# Patient Record
Sex: MALE | Race: WHITE
[De-identification: names, ages, dates, MRNs, and addresses within clinical notes are randomized per-mention and may not be internally consistent; named-entity substitution may affect disease eponyms.]

---

## 2019-09-11 ENCOUNTER — HOSPITAL ENCOUNTER (OUTPATIENT)
Dept: HOSPITAL 92 - BICRAD | Age: 84
Discharge: HOME | End: 2019-09-11
Attending: FAMILY MEDICINE
Payer: MEDICARE

## 2019-09-11 DIAGNOSIS — M16.11: ICD-10-CM

## 2019-09-11 DIAGNOSIS — M25.551: ICD-10-CM

## 2019-09-11 DIAGNOSIS — M79.604: Primary | ICD-10-CM

## 2019-09-11 DIAGNOSIS — M17.11: ICD-10-CM

## 2019-09-11 NOTE — RAD
RIGHT FEMUR 2 VIEWS:

 

Date:  09/11/19 

 

HISTORY:  

Pain in right leg. 

 

COMPARISON:  

None. 

 

FINDINGS:

Surgical clips along the right hemipelvis. There are ring osteophytes at the right femoral head/neck 
junction. No acute displaced fracture is appreciated. 

 

Mild medial and lateral compartment narrowing of the knee. Mild enthesopathic change of the greater t
rochanter. 

 

Right obturator ring is intact. 

 

IMPRESSION: 

1.  Mild degenerative change of the right hip. 

2.  Moderate degenerative changes of the medial and lateral compartments of the right knee. 

3.  Mild enthesopathic changes of the greater trochanter. 

 

 

POS: [facility]

## 2020-05-12 ENCOUNTER — HOSPITAL ENCOUNTER (INPATIENT)
Dept: HOSPITAL 92 - ERS | Age: 85
LOS: 2 days | Discharge: ANOTHER HOSPITAL | DRG: 310 | End: 2020-05-14
Attending: FAMILY MEDICINE | Admitting: FAMILY MEDICINE
Payer: MEDICARE

## 2020-05-12 VITALS — BODY MASS INDEX: 24.9 KG/M2

## 2020-05-12 DIAGNOSIS — Z95.5: ICD-10-CM

## 2020-05-12 DIAGNOSIS — G47.33: ICD-10-CM

## 2020-05-12 DIAGNOSIS — R00.1: Primary | ICD-10-CM

## 2020-05-12 DIAGNOSIS — F32.9: ICD-10-CM

## 2020-05-12 DIAGNOSIS — Z79.82: ICD-10-CM

## 2020-05-12 DIAGNOSIS — R09.89: ICD-10-CM

## 2020-05-12 DIAGNOSIS — E29.1: ICD-10-CM

## 2020-05-12 DIAGNOSIS — Z79.899: ICD-10-CM

## 2020-05-12 DIAGNOSIS — H91.10: ICD-10-CM

## 2020-05-12 DIAGNOSIS — I10: ICD-10-CM

## 2020-05-12 DIAGNOSIS — K21.9: ICD-10-CM

## 2020-05-12 DIAGNOSIS — I25.10: ICD-10-CM

## 2020-05-12 DIAGNOSIS — G25.81: ICD-10-CM

## 2020-05-12 LAB
ALBUMIN SERPL BCG-MCNC: 4.2 G/DL (ref 3.4–4.8)
ALBUMIN SERPL BCG-MCNC: 4.3 G/DL (ref 3.4–4.8)
ALP SERPL-CCNC: 48 U/L (ref 40–110)
ALP SERPL-CCNC: 49 U/L (ref 40–110)
ALT SERPL W P-5'-P-CCNC: 10 U/L (ref 8–55)
ALT SERPL W P-5'-P-CCNC: 11 U/L (ref 8–55)
ANION GAP SERPL CALC-SCNC: 13 MMOL/L (ref 10–20)
ANION GAP SERPL CALC-SCNC: 15 MMOL/L (ref 10–20)
AST SERPL-CCNC: 15 U/L (ref 5–34)
AST SERPL-CCNC: 17 U/L (ref 5–34)
BASOPHILS # BLD AUTO: 0 THOU/UL (ref 0–0.2)
BASOPHILS # BLD AUTO: 0.1 THOU/UL (ref 0–0.2)
BASOPHILS NFR BLD AUTO: 0.3 % (ref 0–1)
BASOPHILS NFR BLD AUTO: 0.6 % (ref 0–1)
BILIRUB SERPL-MCNC: 0.4 MG/DL (ref 0.2–1.2)
BILIRUB SERPL-MCNC: 0.7 MG/DL (ref 0.2–1.2)
BUN SERPL-MCNC: 12 MG/DL (ref 8.4–25.7)
BUN SERPL-MCNC: 9 MG/DL (ref 8.4–25.7)
CALCIUM SERPL-MCNC: 9.4 MG/DL (ref 7.8–10.44)
CALCIUM SERPL-MCNC: 9.4 MG/DL (ref 7.8–10.44)
CHLORIDE SERPL-SCNC: 97 MMOL/L (ref 98–107)
CHLORIDE SERPL-SCNC: 99 MMOL/L (ref 98–107)
CO2 SERPL-SCNC: 23 MMOL/L (ref 23–31)
CO2 SERPL-SCNC: 26 MMOL/L (ref 23–31)
CREAT CL PREDICTED SERPL C-G-VRATE: 0 ML/MIN (ref 70–130)
CREAT CL PREDICTED SERPL C-G-VRATE: 52 ML/MIN (ref 70–130)
EOSINOPHIL # BLD AUTO: 0 THOU/UL (ref 0–0.7)
EOSINOPHIL # BLD AUTO: 0.1 THOU/UL (ref 0–0.7)
EOSINOPHIL NFR BLD AUTO: 0.7 % (ref 0–10)
EOSINOPHIL NFR BLD AUTO: 0.8 % (ref 0–10)
GLOBULIN SER CALC-MCNC: 2.6 G/DL (ref 2.4–3.5)
GLOBULIN SER CALC-MCNC: 2.7 G/DL (ref 2.4–3.5)
GLUCOSE SERPL-MCNC: 103 MG/DL (ref 83–110)
GLUCOSE SERPL-MCNC: 123 MG/DL (ref 83–110)
HGB BLD-MCNC: 12.7 G/DL (ref 14–18)
HGB BLD-MCNC: 13.4 G/DL (ref 14–18)
LYMPHOCYTES # BLD: 0.7 THOU/UL (ref 1.2–3.4)
LYMPHOCYTES # BLD: 2.3 THOU/UL (ref 1.2–3.4)
LYMPHOCYTES NFR BLD AUTO: 12.4 % (ref 21–51)
LYMPHOCYTES NFR BLD AUTO: 19.3 % (ref 21–51)
MAGNESIUM SERPL-MCNC: 2.1 MG/DL (ref 1.6–2.6)
MCH RBC QN AUTO: 29.4 PG (ref 27–31)
MCH RBC QN AUTO: 29.5 PG (ref 27–31)
MCV RBC AUTO: 91.2 FL (ref 78–98)
MCV RBC AUTO: 91.3 FL (ref 78–98)
MONOCYTES # BLD AUTO: 0.5 THOU/UL (ref 0.11–0.59)
MONOCYTES # BLD AUTO: 1.6 THOU/UL (ref 0.11–0.59)
MONOCYTES NFR BLD AUTO: 13.9 % (ref 0–10)
MONOCYTES NFR BLD AUTO: 8.5 % (ref 0–10)
NEUTROPHILS # BLD AUTO: 4.7 THOU/UL (ref 1.4–6.5)
NEUTROPHILS # BLD AUTO: 7.6 THOU/UL (ref 1.4–6.5)
NEUTROPHILS NFR BLD AUTO: 65.5 % (ref 42–75)
NEUTROPHILS NFR BLD AUTO: 78.1 % (ref 42–75)
PLATELET # BLD AUTO: 265 THOU/UL (ref 130–400)
PLATELET # BLD AUTO: 300 THOU/UL (ref 130–400)
POTASSIUM SERPL-SCNC: 4 MMOL/L (ref 3.5–5.1)
POTASSIUM SERPL-SCNC: 4.2 MMOL/L (ref 3.5–5.1)
RBC # BLD AUTO: 4.32 MILL/UL (ref 4.7–6.1)
RBC # BLD AUTO: 4.53 MILL/UL (ref 4.7–6.1)
SODIUM SERPL-SCNC: 132 MMOL/L (ref 136–145)
SODIUM SERPL-SCNC: 133 MMOL/L (ref 136–145)
WBC # BLD AUTO: 11.7 THOU/UL (ref 4.8–10.8)
WBC # BLD AUTO: 6 THOU/UL (ref 4.8–10.8)

## 2020-05-12 PROCEDURE — 84443 ASSAY THYROID STIM HORMONE: CPT

## 2020-05-12 PROCEDURE — 85025 COMPLETE CBC W/AUTO DIFF WBC: CPT

## 2020-05-12 PROCEDURE — 93306 TTE W/DOPPLER COMPLETE: CPT

## 2020-05-12 PROCEDURE — 84100 ASSAY OF PHOSPHORUS: CPT

## 2020-05-12 PROCEDURE — 83605 ASSAY OF LACTIC ACID: CPT

## 2020-05-12 PROCEDURE — 71045 X-RAY EXAM CHEST 1 VIEW: CPT

## 2020-05-12 PROCEDURE — 80048 BASIC METABOLIC PNL TOTAL CA: CPT

## 2020-05-12 PROCEDURE — 36416 COLLJ CAPILLARY BLOOD SPEC: CPT

## 2020-05-12 PROCEDURE — 84484 ASSAY OF TROPONIN QUANT: CPT

## 2020-05-12 PROCEDURE — 36415 COLL VENOUS BLD VENIPUNCTURE: CPT

## 2020-05-12 PROCEDURE — 82533 TOTAL CORTISOL: CPT

## 2020-05-12 PROCEDURE — 83735 ASSAY OF MAGNESIUM: CPT

## 2020-05-12 PROCEDURE — 80053 COMPREHEN METABOLIC PANEL: CPT

## 2020-05-12 PROCEDURE — 93880 EXTRACRANIAL BILAT STUDY: CPT

## 2020-05-12 PROCEDURE — 93005 ELECTROCARDIOGRAM TRACING: CPT

## 2020-05-12 RX ADMIN — Medication SCH ML: at 21:23

## 2020-05-12 RX ADMIN — ISOSORBIDE MONONITRATE SCH MG: 30 TABLET, EXTENDED RELEASE ORAL at 21:23

## 2020-05-12 NOTE — RAD
CHEST 1 VIEW:

 

HISTORY: 

Syncope and collapse.

 

COMPARISON: 

Radiograph 10/18/2017.

 

FINDINGS: 

Pulmonary arteries are mildly distended.  The transverse aorta has dense calcifications.  No confluen
t airspace consolidation, pneumothorax, or effusion.  Moderate degenerative change of both shoulders.


 

IMPRESSION: 

No acute intrathoracic abnormality.

 

POS: HOME

## 2020-05-13 LAB
ANION GAP SERPL CALC-SCNC: 13 MMOL/L (ref 10–20)
BASOPHILS # BLD AUTO: 0 THOU/UL (ref 0–0.2)
BASOPHILS NFR BLD AUTO: 0.1 % (ref 0–1)
BUN SERPL-MCNC: 12 MG/DL (ref 8.4–25.7)
CALCIUM SERPL-MCNC: 8.8 MG/DL (ref 7.8–10.44)
CHLORIDE SERPL-SCNC: 99 MMOL/L (ref 98–107)
CO2 SERPL-SCNC: 25 MMOL/L (ref 23–31)
CREAT CL PREDICTED SERPL C-G-VRATE: 64 ML/MIN (ref 70–130)
EOSINOPHIL # BLD AUTO: 0 THOU/UL (ref 0–0.7)
EOSINOPHIL NFR BLD AUTO: 0.2 % (ref 0–10)
GLUCOSE SERPL-MCNC: 113 MG/DL (ref 83–110)
HGB BLD-MCNC: 12.6 G/DL (ref 14–18)
LYMPHOCYTES # BLD: 0.9 THOU/UL (ref 1.2–3.4)
LYMPHOCYTES NFR BLD AUTO: 11.2 % (ref 21–51)
MCH RBC QN AUTO: 30.4 PG (ref 27–31)
MCV RBC AUTO: 92 FL (ref 78–98)
MONOCYTES # BLD AUTO: 0.8 THOU/UL (ref 0.11–0.59)
MONOCYTES NFR BLD AUTO: 9.7 % (ref 0–10)
NEUTROPHILS # BLD AUTO: 6.4 THOU/UL (ref 1.4–6.5)
NEUTROPHILS NFR BLD AUTO: 78.9 % (ref 42–75)
PLATELET # BLD AUTO: 268 THOU/UL (ref 130–400)
POTASSIUM SERPL-SCNC: 4.2 MMOL/L (ref 3.5–5.1)
RBC # BLD AUTO: 4.15 MILL/UL (ref 4.7–6.1)
SODIUM SERPL-SCNC: 133 MMOL/L (ref 136–145)
WBC # BLD AUTO: 8.2 THOU/UL (ref 4.8–10.8)

## 2020-05-13 RX ADMIN — Medication SCH ML: at 08:56

## 2020-05-13 RX ADMIN — ISOSORBIDE MONONITRATE SCH MG: 30 TABLET, EXTENDED RELEASE ORAL at 20:17

## 2020-05-13 RX ADMIN — Medication SCH ML: at 20:17

## 2020-05-13 RX ADMIN — ASPIRIN 81 MG CHEWABLE TABLET SCH MG: 81 TABLET CHEWABLE at 08:55

## 2020-05-13 NOTE — HP
CHIEF COMPLAINT:  Syncope.



HISTORY OF PRESENT ILLNESS:  The patient is an 85-year-old gentleman, who was 
at the

ear doctor, getting his ears examined when the physician noted he was not

responsive.  He actually lost control of his bladder.  EMS was called.  When 
they

arrived, his pulse was noted to be in the 30s.  They gave him some atropine and 
by

the time he arrived in the ER at [facility], his pulse rate was under the 
70s

and has been elevated ever since.  He does not recall the details as much as he

remembers getting lightheaded while the physician was examining him.  This is 
one of

many syncopal episodes this gentleman has had over the last several years.  It 
has

been a common occurrence and he has been approached about having a possible

pacemaker in the past, which he is adamantly opposed to.  He is in the ER at 
[facility] now, currently in no acute distress, and understands that he will be 
watched

overnight for further assessment of his heart rate and cardiology consultation 
will

be obtained as well as an echocardiogram and serial reassessments and 
orthostatic

vital signs. 



PAST MEDICAL HISTORY:  As mentioned above is significant for multiple

hospitalizations for previous syncopal episodes.  He also has severe

gastroesophageal reflux disease, hypertension, chronic dizziness, history of

obstructive sleep apnea.  He is very hard of hearing.  History of coronary 
artery

disease with stent, restless legs syndrome, depression, hypogonadism. 



PAST SURGICAL HISTORY:  Includes repair of inguinal hernia, tonsillectomy,

hemorrhoidectomy, prostate cancer in 2006, and severe head trauma with a horse 
in

1963. 



ALLERGIES:  NONE.



SOCIAL HISTORY:  He is .  Has not smoked or drinks alcohol.  He is 
retired.



FAMILY HISTORY:  Noncontributory.



MEDICATIONS ON ADMISSION:  Include:

1. Amlodipine recently switched from 5 mg b.i.d. to 10 mg.

2. Aspirin 81 mg daily.

3. Clonidine 0.1 mg b.i.d.

4. Imdur 30 mg at bedtime.

5. Losartan 100 mg p.o. daily.

6. Pantoprazole 20 mg daily.

7. Ropinirole 1 mg at bedtime.

8. He is also on sertraline 25 mg at bedtime.

9. At the time of admission on bisoprolol hydrochlorothiazide 5/6.25 mg daily.

10. He also takes testosterone 1% gel four pumps daily.



REVIEW OF SYSTEMS:  At the time of admission: 

CONSTITUTIONAL:  Denies any recent fever, chills, or weight loss. 

HEENT:  He has had trouble getting his ears cleaned, which is why he was at the 
ear

physician, but has had no drainage or discharge or pain from these. 

CARDIOVASCULAR:  Denies chest pain or palpitations. 

PULMONARY:  Denies shortness of breath or cough. 

GASTROINTESTINAL:  Denies any nausea, vomiting, or diarrhea. 

:  Denies any dysuria or blood in urine or stool. 

ENDOCRINE:  Denies any polydipsia, polyphagia, or intolerance to temperatures 
hot or

cold. 

MUSCULOSKELETAL:  Has diffuse arthralgias, but no specific joints at this time 
are

painful. 

SKIN:  No new rashes or lesions. 

NEUROLOGICAL:  No trouble with mentation, although he is very hard of hearing.



PHYSICAL EXAMINATION:

VITAL SIGNS:  At the time of admission, blood pressure 167/100, pulse 87,

respirations 16, temperature is 98.3.  Pain scale 0. 

GENERAL:  This is a well-developed, well-nourished, elderly  male, 
alert,

oriented, cooperative. 

HEENT:  Normocephalic, atraumatic.  Pupils are equal, round, and reactive to 
light

with arcus senilis bilaterally.  TMs, nares, and pharynx are clear. 

NECK:  Supple.  Trachea midline.  No bruits. 

CHEST:  Clear to auscultation. 

HEART:  Regular rate and rhythm without murmur. 

ABDOMEN:  Scaphoid and nontender without hepatosplenomegaly. 

:  Deferred. 

EXTREMITIES:  Without clubbing, cyanosis, or edema.  Symmetrical muscular 
wasting in

upper and lower extremities.  Normal range of motion of all upper and lower

extremities. 

SKIN:  No acute rashes or lesions. 

NEUROLOGIC:  Cranial nerves except for cranial nerve 8 are intact.  Gait and

cerebellar function intact.  Sensory exam is grossly intact.  Mental status is 
at

baseline and nonfocal. 



LABORATORY DATA:  Lab work thus far shows WBC 6, hemoglobin 12.7, hematocrit 
39.4

with platelets of 265.  Chest x-ray shows no acute intrathoracic disease.  
Sodium

132, potassium 4.2, chloride 97, CO2 of 26, BUN 9, creatinine 0.95 with a GFR 75
,

glucose 103,.  Liver functions unremarkable.  Troponins

unremarkable. 



ASSESSMENT:  

1. Syncopal episode.  Etiologies include possible bradycardia.

2. Hypertension.

3. Severe anxiety disorder.

4. Presbycusis.



PLAN:  Plan will be observing his orthostatic vital signs on telemetry.  
Cardiology

consultation done.  We will also get echocardiogram and serially re-evaluate

him. 







Job ID:  [id number]



MTDD

## 2020-05-14 VITALS — SYSTOLIC BLOOD PRESSURE: 148 MMHG | DIASTOLIC BLOOD PRESSURE: 64 MMHG

## 2020-05-14 VITALS — TEMPERATURE: 97.4 F

## 2020-05-14 RX ADMIN — ASPIRIN 81 MG CHEWABLE TABLET SCH MG: 81 TABLET CHEWABLE at 08:25

## 2020-05-14 RX ADMIN — Medication SCH: at 08:25

## 2020-05-14 NOTE — CON
DATE OF CONSULTATION:  



HISTORY:  Donald Peoples is an 85-year-old white male with previous history of

coronary stent placement and multiple episodes of syncope who is usually a 
patient

of Dr. Jacobson.  He was hospitalized here in 2017, and was seen by Dr. Harris, 
however,

apparently the family requested that I see him while he was at [place].

Yesterday, he was at the ENT doctor and had a syncopal episode and was

unresponsive.  When paramedics arrived, he had heart rates in the 30s.  He was 
given

atropine and transferred here.  Also last night, he had an episode of 
bradycardia

with heart rates in the 30s.  He denies any chest discomfort or shortness of 
breath.

 He states he has frequent lightheaded spells at home, but is uncertain if he 
ever

passes out. 



PAST MEDICAL HISTORY:  Coronary artery disease, hypertension, GERD, obstructive

sleep apnea, hard of hearing, depression. 



PAST SURGICAL HISTORY:  Coronary artery stents, inguinal herniorrhaphy,

tonsillectomy, hemorrhoidectomy, prostate cancer.  He had a head trauma from a 
horse

in 1963. 



MEDICATIONS:  Amlodipine 10 mg daily, Diflucan 100 mg daily, ibuprofen 600 mg 
q.6

hours p.r.n., losartan 100 daily, Protonix 20 daily, and Bactrim 1 b.i.d. 



ALLERGIES:  NONE.



SOCIAL HISTORY:  He does not smoke or drink.



REVIEW OF SYSTEMS:  Unremarkable.



PHYSICAL EXAMINATION:

VITAL SIGNS:  Blood pressure 136/59, pulse 57. 

HEENT:  PERRL. 

NECK:  Supple. 

CHEST:  Clear. 

CARDIAC:  S1 and S2 normal without any S3, S4, or murmurs.  Carotid upstroke was

normal with bilateral carotid artery bruits. 

ABDOMEN:  Normal bowel sounds without tenderness or organomegaly. 

EXTREMITIES:  Reveal no clubbing, cyanosis, or edema. 

NEUROLOGICAL:  Grossly intact except for hard of hearing.



LABORATORY DATA:  EKG revealed sinus rhythm with first-degree AV block.

Echocardiogram revealed ejection fraction of 60% to 65% with evidence of 
diastolic

dysfunction, mild mitral regurgitation, mild aortic regurgitation, and trace

tricuspid regurgitation.  Hemoglobin 12.6, hematocrit 38.2, white count 8200,

platelets 268,000.  Sodium 133, potassium 4.2, chloride 99, carbon dioxide 25, 
BUN

12, creatinine 0.99.  Troponin I is normal x2.  TSH is normal.  Cortisol is 
normal. 



IMPRESSION:  

1. Multiple syncopal episodes secondary to sinus bradycardia.  He has documented

heart rate with paramedics apparently at 30 per minute and also here in the 
hospital

at 30 per minute. 

2. Bilateral carotid artery bruits.  In looking at the computer, I do not see 
that

this has ever been evaluated.  Certainly, this may also be contributing to his

syncope. 

3. Coronary artery disease, status post stent placement.

4. Hypertension.

5. Gastroesophageal reflux disease.

6. Obstructive sleep apnea.

7. Hard of hearing.

8. Anxiety.



PLAN:  The situation was discussed with the patient.  Carotid Doppler will be 
obtained

to evaluate his bruits.  He certainly should have a pacemaker inserted; however
, I

am not certain that he or his wife wish to pursue that in this institution.  If 
not,

he needs to be either transferred or an early appointment with his usual

cardiologist. 







Job ID:  [id number]



MTDD

## 2020-05-14 NOTE — ULT
Carotid duplex sonogram



HISTORY: Syncope. Bilateral bruits. Vascular disease.



FINDINGS:



Right: Scattered mild plaque. Color and spectral Doppler evaluation, peak systolic velocity of 89 cm/
s, and IC to CC ratio of 1.1 suggest no hemodynamically significant stenosis within the

extracranial right ICA. Antegrade flow within the vertebral artery.



Left: Mild plaque. Echogenic stent within the ICA is patent. Color and spectral Doppler evaluation, p
eak systolic velocity of 101 cm/s, and IC to CC ratio of 1.2 suggest no hemodynamically significant

stenosis within the extracranial left ICA. Antegrade flow within the vertebral artery.



  



IMPRESSION :

Atherosclerosis. Left ICA stent.



No sonographic evidence of significant extracranial ICA stenosis.



Reported By: ZENY Greenwood 

Electronically Signed:  5/14/2020 8:15 AM

## 2020-05-15 NOTE — DIS
DATE OF ADMISSION:  05/13/2020



DATE OF DISCHARGE:  05/14/2020



DATE OF TRANSFER TO [facility]:  05/14/2020.



CHIEF COMPLAINT ON ADMISSION:  Syncope due to bradycardia. 



History and physical have been dictated.  I will resume from there.



HOSPITAL COURSE:  The patient was placed in an IMCU monitored bed, where 
supportive

measures were maintained.  Over the next 24 hours, he had a repeat episode of

bradycardia, for which he passed out.  He was easily revived with symptomatic

treatment, but then proceeded to vomit afterwards, which was his typical post

syncopal procedure.  Thereafter, he was fine.  Dr. Rollins was called about this

shortly after it occurred, but was told he did not need to come to the code 
green

because the patient is in his usual state of good health.  His viral load was 
back

into the 60s and his services were not needed.  Over the first 24 hours in the

hospital, Dr. Rollins had ordered an echocardiogram and since when it had not been

performed in quite some time, his ejection fraction was from 65% to 69%.  He had

some mild diastolic dysfunction.  Otherwise, his heart looked good.  The 
patient was

noted to have carotid bruits when Dr. Logan was consulted and a carotid

ultrasound was ordered, which failed to show any significant carotid stenosis.  
When

it was evident that a pacemaker was needed, Dr. Logan and Dr. Rollins were

confirmed with the patient, who adamantly refused to have a pacemaker placed at 
this

institution and so arrangements were made to transfer him to see [facility], where his cardiologist, Dr. Basilio Jacobson was contacted and accepted 
him in

transfer.  He will possibly be able to talk the patient into a pacemaker, 
should he

agree that would be advantageous.  The patient is transferred to [facility]

in stable condition. 



His diagnoses at the time of transfer:

1. Syncope due to bradycardia.

2. Sick sinus syndrome.

3. Hypertension.

4. Generalized anxiety disorder.

5. Severe presbycusis. 



The patient's medications will be maintained the same from one institution to 
the

next. The time required to prepare the pt for transfer including reconciliation 
of his medication came to  30 minutes.



He is to follow up with Dr. Rollins in 1 week after discharge from [facility].







Job ID:  [id number]



[facility]

## 2020-05-16 NOTE — EKG
Test Reason : 

Blood Pressure : ***/*** mmHG

Vent. Rate : 068 BPM     Atrial Rate : 068 BPM

   P-R Int : 246 ms          QRS Dur : 102 ms

    QT Int : 390 ms       P-R-T Axes : 056 -43 056 degrees

   QTc Int : 414 ms

 

Sinus rhythm with sinus arrhythmia with 1st degree A-V block

Left axis deviation

Moderate voltage criteria for LVH, may be normal variant

Abnormal ECG

 

Confirmed by CAROL ANN CARBALLO DO ([id number]),  NIKKI AMARAL ([id number]) on 5/16/2020 1:00:53 PM

 

Referred By:             Confirmed By:CAROL ANN CARBALLO DO

## 2020-06-05 ENCOUNTER — HOSPITAL ENCOUNTER (OUTPATIENT)
Dept: HOSPITAL 92 - BICULT | Age: 85
Discharge: HOME | End: 2020-06-05
Attending: FAMILY MEDICINE
Payer: MEDICARE

## 2020-06-05 DIAGNOSIS — E04.1: Primary | ICD-10-CM

## 2020-06-05 PROCEDURE — 76536 US EXAM OF HEAD AND NECK: CPT

## 2020-06-05 NOTE — ULT
EXAM:

US Thyroid STANDARD



PROVIDED CLINICAL HISTORY:

Thyroid nodule.



COMPARISON:

None



FINDINGS:

Right lobe of the thyroid gland measures 4.1 cm x 1.1 cm x 2 cm with the left lobe measuring 4.4 cm x
 1.2 cm x 2 cm. The thyroid isthmus measures 0.23 cm in thickness.



There is a 9 mm heterogeneous but predominantly hypoechoic nodule with circumscribed margins seen in 
the midportion left lobe of the thyroid gland with a single punctate echogenic focus seen. No

additional nodule is seen in either lobe of the thyroid gland.



IMPRESSION:

TI RADS level 5-highly suspicious nodule left lobe thyroid gland. According to ACR guidelines, and ba
sed on size criteria, follow-up evaluation is recommended in one year.



Reported By: Warren Corral 

Electronically Signed:  6/5/2020 8:44 AM

## 2022-08-18 ENCOUNTER — HOSPITAL ENCOUNTER (EMERGENCY)
Dept: HOSPITAL 92 - CSHERS | Age: 87
Discharge: HOME | End: 2022-08-18
Payer: MEDICARE

## 2022-08-18 DIAGNOSIS — F41.9: Primary | ICD-10-CM

## 2022-08-18 DIAGNOSIS — E87.1: ICD-10-CM

## 2022-08-18 DIAGNOSIS — K21.9: ICD-10-CM

## 2022-08-18 DIAGNOSIS — Z79.899: ICD-10-CM

## 2022-08-18 DIAGNOSIS — Z79.82: ICD-10-CM

## 2022-08-18 DIAGNOSIS — R05.9: ICD-10-CM

## 2022-08-18 DIAGNOSIS — I10: ICD-10-CM

## 2022-08-18 LAB
ALBUMIN SERPL BCG-MCNC: 4.5 G/DL (ref 3.4–4.8)
ALP SERPL-CCNC: 45 U/L (ref 40–110)
ALT SERPL W P-5'-P-CCNC: 19 U/L (ref 8–55)
ANION GAP SERPL CALC-SCNC: 14 MMOL/L (ref 10–20)
AST SERPL-CCNC: 27 U/L (ref 5–34)
BASOPHILS # BLD AUTO: 0 10X3/UL (ref 0–0.2)
BASOPHILS NFR BLD AUTO: 0.2 % (ref 0–2)
BILIRUB SERPL-MCNC: 0.7 MG/DL (ref 0.2–1.2)
BUN SERPL-MCNC: 7 MG/DL (ref 8.4–25.7)
CALCIUM SERPL-MCNC: 9 MG/DL (ref 7.8–10.44)
CHLORIDE SERPL-SCNC: 93 MMOL/L (ref 98–107)
CO2 SERPL-SCNC: 23 MMOL/L (ref 23–31)
CREAT CL PREDICTED SERPL C-G-VRATE: 0 ML/MIN (ref 70–130)
EOSINOPHIL # BLD AUTO: 0 10X3/UL (ref 0–0.5)
EOSINOPHIL NFR BLD AUTO: 0.9 % (ref 0–6)
GLOBULIN SER CALC-MCNC: 2.5 G/DL (ref 2.4–3.5)
GLUCOSE SERPL-MCNC: 105 MG/DL (ref 83–110)
HGB BLD-MCNC: 12.5 G/DL (ref 13.5–17.5)
LYMPHOCYTES NFR BLD AUTO: 28.7 % (ref 18–47)
MCH RBC QN AUTO: 30.8 PG (ref 27–33)
MCV RBC AUTO: 84.2 FL (ref 81.2–95.1)
MONOCYTES # BLD AUTO: 0.5 10X3/UL (ref 0–1.1)
MONOCYTES NFR BLD AUTO: 11.8 % (ref 0–10)
NEUTROPHILS # BLD AUTO: 2.5 10X3/UL (ref 1.5–8.4)
NEUTROPHILS NFR BLD AUTO: 57.9 % (ref 40–75)
PLATELET # BLD AUTO: 337 10X3/UL (ref 150–450)
POTASSIUM SERPL-SCNC: 4 MMOL/L (ref 3.5–5.1)
RBC # BLD AUTO: 4.06 10X6/UL (ref 4.32–5.72)
SODIUM SERPL-SCNC: 126 MMOL/L (ref 136–145)
SP GR UR STRIP: 1 (ref 1–1.04)
WBC # BLD AUTO: 4.3 10X3/UL (ref 3.5–10.5)

## 2022-08-18 PROCEDURE — 93005 ELECTROCARDIOGRAM TRACING: CPT

## 2022-08-18 PROCEDURE — 85025 COMPLETE CBC W/AUTO DIFF WBC: CPT

## 2022-08-18 PROCEDURE — 84484 ASSAY OF TROPONIN QUANT: CPT

## 2022-08-18 PROCEDURE — 71045 X-RAY EXAM CHEST 1 VIEW: CPT

## 2022-08-18 PROCEDURE — 81003 URINALYSIS AUTO W/O SCOPE: CPT

## 2022-08-18 PROCEDURE — 36415 COLL VENOUS BLD VENIPUNCTURE: CPT

## 2022-08-18 PROCEDURE — 87040 BLOOD CULTURE FOR BACTERIA: CPT

## 2022-08-18 PROCEDURE — 80053 COMPREHEN METABOLIC PANEL: CPT
